# Patient Record
Sex: MALE | Race: WHITE
[De-identification: names, ages, dates, MRNs, and addresses within clinical notes are randomized per-mention and may not be internally consistent; named-entity substitution may affect disease eponyms.]

---

## 2017-07-21 ENCOUNTER — HOSPITAL ENCOUNTER (OUTPATIENT)
Dept: HOSPITAL 17 - HROP | Age: 67
Discharge: HOME | End: 2017-07-21
Attending: UROLOGY
Payer: MEDICARE

## 2017-07-21 VITALS
HEART RATE: 63 BPM | RESPIRATION RATE: 16 BRPM | SYSTOLIC BLOOD PRESSURE: 103 MMHG | TEMPERATURE: 98.1 F | OXYGEN SATURATION: 98 % | DIASTOLIC BLOOD PRESSURE: 67 MMHG

## 2017-07-21 VITALS
SYSTOLIC BLOOD PRESSURE: 104 MMHG | HEART RATE: 54 BPM | OXYGEN SATURATION: 94 % | RESPIRATION RATE: 16 BRPM | DIASTOLIC BLOOD PRESSURE: 64 MMHG

## 2017-07-21 VITALS
DIASTOLIC BLOOD PRESSURE: 72 MMHG | SYSTOLIC BLOOD PRESSURE: 104 MMHG | OXYGEN SATURATION: 93 % | RESPIRATION RATE: 16 BRPM | HEART RATE: 56 BPM

## 2017-07-21 VITALS — WEIGHT: 184.31 LBS | HEIGHT: 68 IN | BODY MASS INDEX: 27.93 KG/M2

## 2017-07-21 VITALS
HEART RATE: 55 BPM | OXYGEN SATURATION: 94 % | DIASTOLIC BLOOD PRESSURE: 66 MMHG | SYSTOLIC BLOOD PRESSURE: 102 MMHG | RESPIRATION RATE: 16 BRPM

## 2017-07-21 DIAGNOSIS — J44.9: ICD-10-CM

## 2017-07-21 DIAGNOSIS — E78.5: ICD-10-CM

## 2017-07-21 DIAGNOSIS — C61: Primary | ICD-10-CM

## 2017-07-21 DIAGNOSIS — I10: ICD-10-CM

## 2017-07-21 LAB
APTT BLD: 26.6 SEC (ref 24.3–30.1)
INR PPP: 0.9 RATIO
PROTHROMBIN TIME: 10.1 SEC (ref 9.8–11.6)

## 2017-07-21 PROCEDURE — 85730 THROMBOPLASTIN TIME PARTIAL: CPT

## 2017-07-21 PROCEDURE — 99152 MOD SED SAME PHYS/QHP 5/>YRS: CPT

## 2017-07-21 PROCEDURE — 77001 FLUOROGUIDE FOR VEIN DEVICE: CPT

## 2017-07-21 PROCEDURE — C1769 GUIDE WIRE: HCPCS

## 2017-07-21 PROCEDURE — C1750 CATH, HEMODIALYSIS,LONG-TERM: HCPCS

## 2017-07-21 PROCEDURE — 99153 MOD SED SAME PHYS/QHP EA: CPT

## 2017-07-21 PROCEDURE — 85610 PROTHROMBIN TIME: CPT

## 2017-07-21 PROCEDURE — 76937 US GUIDE VASCULAR ACCESS: CPT

## 2017-07-21 PROCEDURE — 36558 INSERT TUNNELED CV CATH: CPT

## 2017-07-21 NOTE — RADRPT
EXAM DATE/TIME:  07/21/2017 08:28 

 

This report includes an Addendum and supersedes previous reports for this exam.

 

 

 

[facility] COMPARISON:  

No previous studies available for comparison.

                     

 

INDICATIONS :               

Patient with history of metastatic prostate cancer in need of tunnelled dialysis catheter for plasmap
heresis

                     

 

MEDICAL HISTORY :     

HLD, HTN, Prostate cancer, COPD

 

SURGICAL HISTORY :     

Prostate needle biopsy, Colonoscopy, Brachytherapy

 

ENCOUNTER:     

Initial

 

ACUITY:     

1 month

 

PAIN SCORE:     

0/10

                     

 

FLUORO TIME:     

0.3 minutes

 

IMAGE SERIES:      

0

 

SEDATION TIME:       

30 minutes

                     

 

ACCESS:     

Right internal jugular vein 

 

SEDATION:      

1.)  4 mg midazolam (Versed)  IV     

2.)  200 mcg fentanyl (Sublimaze)  IV     

      

Prophylactic antibiotics were administered with appropriate pre-procedure timing.     

Vancomycin within 2 hours of procedure, Ancef (or alternative) within 1 hour of procedure.     

      

 

DEVICE:      

1.  15 Mongolian single lumen 19 cm Ross II Plus catheter      

 

 

PROCEDURE :     

1.  Ultrasound-guided venipuncture.

2.  PermaCath placement.

3.  Conscious sedation with continuous EKG and oximetry monitoring.

 

The risks, benefits and alternatives to the procedure were explained and verbal and written consent w
as obtained.  The site was prepped in sterile fashion.  Full sterile technique was used, including ca
p, mask, sterile gloves and gown and a large sterile sheet.  Hand hygiene and 2% chlorhexidine and/or
 betadine/alcohol prep was utilized per protocol for cutaneous antisepsis.  The skin and subcutaneous
 tissues were infiltrated with local anesthetic solution.

 

With ultrasound and fluoroscopic guidance a dermatotomy was created over the prescribed vein.  A micr
opuncture set was used to access the targeted vein and serial dilatation was performed to accept the 
prescribed length catheter.  A subcutaneous tunnel was created in a retrograde fashion the catheter w
as pulled through the tunnel.  The catheter was flushed and assembled and locked with heparin.  The c
atheter was sutured in place.

 

Conscious sedation was performed with the prescribed dosages and duration as above in the presence of
 an independent trained radiology nurse to assist in the monitoring of the patient.  EKG and oximetry
 remained stable throughout the procedure.  The patient tolerated the procedure well and there were n
o complications.    The patient was sent to post anesthesia recovery in stable condition.

 

CONCLUSION:     Uncomplicated PermaCath placement as above.

 

 

 

 Eladio Otto Jr., MD on July 21, 2017 at 9:53           

Board Certified Radiologist.

 This report was verified electronically. 

 

ADDENDUM: 

 

There is a correction under the device listed. The device utilized is a 15 Mongolian 19 cm dual lumen Ca
nnon II plus catheter.

 

 Eladio Otto Jr., MD on July 21, 2017 at 15:05           

Board Certified Radiologist.

 This report was verified electronically.

## 2017-07-21 NOTE — PD.RAD
Post Procedure Progress Note


Pre Procedure Diagnosis:  


(1) Prostate cancer


Post Procedure Diagnosis:  


(1) Prostate cancer


Procedure Date:


Jul 21, 2017


Supervising Radiologist:


Eladio Otto JR


Proceduralist/Assist:  Denisse Medina, RT(R), Lauren Reyes, RT(R)(VI)


Anesthesia:  Conscious Sedation


Plan of Activity


Patient to Unit:  [facility]


Patient Condition:  Good


See PACS Report for procedural detail/treatment





Central Venous Access Device


Procedure 1


Right


Internal Jugular


Hemodialysis Catheter Tunneled


Placement


dual lumen


Qatari:  15


Findings:


Permcath in good position. OK to use.


Plan


Remove sutures in 2-3 weeks.








Jr. Fam,Eladio Merlos MD Jul 21, 2017 09:19

## 2017-08-11 ENCOUNTER — HOSPITAL ENCOUNTER (OUTPATIENT)
Dept: HOSPITAL 17 - HROP | Age: 67
Discharge: HOME | End: 2017-08-11
Attending: UROLOGY
Payer: MEDICARE

## 2017-08-11 VITALS
HEART RATE: 74 BPM | TEMPERATURE: 97.9 F | DIASTOLIC BLOOD PRESSURE: 82 MMHG | SYSTOLIC BLOOD PRESSURE: 142 MMHG | RESPIRATION RATE: 18 BRPM | OXYGEN SATURATION: 98 %

## 2017-08-11 VITALS
DIASTOLIC BLOOD PRESSURE: 85 MMHG | SYSTOLIC BLOOD PRESSURE: 140 MMHG | HEART RATE: 63 BPM | RESPIRATION RATE: 18 BRPM | OXYGEN SATURATION: 98 %

## 2017-08-11 DIAGNOSIS — Z85.46: ICD-10-CM

## 2017-08-11 DIAGNOSIS — Z45.2: Primary | ICD-10-CM

## 2017-08-11 DIAGNOSIS — Z79.899: ICD-10-CM

## 2017-08-11 DIAGNOSIS — E78.5: ICD-10-CM

## 2017-08-11 DIAGNOSIS — J44.9: ICD-10-CM

## 2017-08-11 DIAGNOSIS — Z79.82: ICD-10-CM

## 2017-08-11 PROCEDURE — 77001 FLUOROGUIDE FOR VEIN DEVICE: CPT

## 2017-08-11 PROCEDURE — 36589 REMOVAL TUNNELED CV CATH: CPT

## 2017-08-11 NOTE — RADRPT
EXAM DATE/TIME:  08/11/2017 00:00 

 

[facility] COMPARISON:  

No previous studies available for comparison.

 

 

INDICATIONS :               

Patient presents with a history of  prostate cancer in need of dialysis catheter removal due to plasm
apheresis treatment exchange completion. 

                     

 

MEDICAL HISTORY :     

Hx prostate cancer

COPD

HLD

 

SURGICAL HISTORY :     

Prostate needle biopsy

Colonoscopy 

Brachytherapy

 

ENCOUNTER:     

Subsequent

 

ACUITY:     

2 weeks

 

PAIN SCORE:     

0/10

 

LOCATION:        

N/A

                     

                      

 

IMAGE SERIES:      

0

                     

                     

 

      

 

TECH NOTE:   

Dialysis catheter removed without fluoroscopy.DILCIA KAY MR#:[id number] DOB[birth year] Exam Dt/Desc:
 August 11, 2017CENTRAL VENOUS CATHETER REMOVAL, TUNNELED RIGHT     

 

PROCEDURE :     

1.  PermaCath removal.

 

The risks, benefits and alternatives to the procedure were explained and verbal and written consent w
as obtained.  The site was prepped in sterile fashion.  Full sterile technique was used, including ca
p, mask, sterile gloves and gown and a large sterile sheet.  Hand hygiene and 2% chlorhexidine and/or
 betadine/alcohol prep was utilized per protocol for cutaneous antisepsis.  The skin and subcutaneous
 tissues were infiltrated with local anesthetic solution.

 

The tract was anesthetized with 1% Lidocaine using.  The Permcath was dissected from the subcutaneous
 tissues and easily removed in one piece.  Manual pressure was applied to the venotomy site until hem
ostasis was obtained.  Sterile dressing was applied.

 

 

The patient tolerated the procedure well and there were no complications.

 

CONCLUSION:     Uncomplicated Permcath removal.

 

 

 

 Santiago Newton MD on August 11, 2017 at 16:41           

Board Certified Radiologist.

 This report was verified electronically.

## 2018-05-10 ENCOUNTER — HOSPITAL ENCOUNTER (EMERGENCY)
Dept: HOSPITAL 17 - PHED | Age: 68
LOS: 1 days | Discharge: HOME | End: 2018-05-11
Payer: MEDICARE

## 2018-05-10 VITALS
SYSTOLIC BLOOD PRESSURE: 154 MMHG | RESPIRATION RATE: 18 BRPM | OXYGEN SATURATION: 100 % | TEMPERATURE: 99 F | HEART RATE: 92 BPM | DIASTOLIC BLOOD PRESSURE: 98 MMHG

## 2018-05-10 VITALS
RESPIRATION RATE: 18 BRPM | OXYGEN SATURATION: 100 % | HEART RATE: 70 BPM | DIASTOLIC BLOOD PRESSURE: 82 MMHG | SYSTOLIC BLOOD PRESSURE: 143 MMHG

## 2018-05-10 VITALS — OXYGEN SATURATION: 100 % | RESPIRATION RATE: 20 BRPM

## 2018-05-10 VITALS — WEIGHT: 181.66 LBS | HEIGHT: 68 IN | BODY MASS INDEX: 27.53 KG/M2

## 2018-05-10 DIAGNOSIS — R59.1: ICD-10-CM

## 2018-05-10 DIAGNOSIS — K57.90: ICD-10-CM

## 2018-05-10 DIAGNOSIS — J44.9: ICD-10-CM

## 2018-05-10 DIAGNOSIS — R33.9: Primary | ICD-10-CM

## 2018-05-10 DIAGNOSIS — Z79.82: ICD-10-CM

## 2018-05-10 DIAGNOSIS — N32.89: ICD-10-CM

## 2018-05-10 DIAGNOSIS — F32.9: ICD-10-CM

## 2018-05-10 DIAGNOSIS — I10: ICD-10-CM

## 2018-05-10 DIAGNOSIS — K94.09: ICD-10-CM

## 2018-05-10 PROCEDURE — 96375 TX/PRO/DX INJ NEW DRUG ADDON: CPT

## 2018-05-10 PROCEDURE — 85025 COMPLETE CBC W/AUTO DIFF WBC: CPT

## 2018-05-10 PROCEDURE — 80053 COMPREHEN METABOLIC PANEL: CPT

## 2018-05-10 PROCEDURE — 74176 CT ABD & PELVIS W/O CONTRAST: CPT

## 2018-05-10 PROCEDURE — 96374 THER/PROPH/DIAG INJ IV PUSH: CPT

## 2018-05-10 PROCEDURE — 83690 ASSAY OF LIPASE: CPT

## 2018-05-10 PROCEDURE — 99284 EMERGENCY DEPT VISIT MOD MDM: CPT

## 2018-05-10 PROCEDURE — 51702 INSERT TEMP BLADDER CATH: CPT

## 2018-05-10 NOTE — RADRPT
EXAM DATE/TIME:  05/10/2018 22:39 

 

[facility] COMPARISON:     

No previous studies available for comparison.

 

 

INDICATIONS :     

Dysuria.

                  

 

ORAL CONTRAST:      

No oral contrast ingested.

                  

 

RADIATION DOSE:     

9.63 CTDIvol (mGy) 

 

 

MEDICAL HISTORY :     

Hypertension. Chronic obstructive pulmonary disease. Carcinoma, prostate.Hiatal hernia, rectal mass

 

SURGICAL HISTORY :      

Colostomy. 

 

ENCOUNTER:      

Initial

 

ACUITY:      

1 day

 

PAIN SCALE:      

10/10

 

LOCATION:         

abdomen

 

TECHNIQUE:     

Volumetric scanning of the abdomen and pelvis was performed.  Using automated exposure control and ad
justment of the mA and/or kV according to patient size, radiation dose was kept as low as reasonably 
achievable to obtain optimal diagnostic quality images.  DICOM format image data is available electro
nically for review and comparison.  

 

FINDINGS:     

 

LOWER LUNGS:     

The visualized lower lungs are clear.

 

LIVER:     

Homogeneous density without lesion.  There is no dilation of the biliary tree.  No calcified gallston
es.

 

SPLEEN:     

Normal size without lesion.

 

PANCREAS:     

Within normal limits. 

 

KIDNEYS:     

Normal in size and shape.  There is no mass, stone, or hydronephrosis on the left. Punctate nonobstru
cting calculus upper pole right kidney measures 2 mm. There is mild prominence right collecting syste
m. No distal ureteral calculus.

 

ADRENAL GLANDS:     

Within normal limits.

 

VASCULAR:     

There is no aortic aneurysm.

 

BOWEL/MESENTERY:     

Left lower quadrant colostomy with stomal hernia. No signs of strangulation or obstruction. Diverticu
losis of the colon.  There is no free intraperitoneal air or fluid. 

 

ABDOMINAL WALL:     

Within normal limits.

 

RETROPERITONEUM:     

Prominent retroperitoneal adenopathy largest distal bifurcation measuring 2.5 x 1.8 cm and the left..


 

BLADDER:     

No wall thickening or mass. There is hyperdensity/debris within the bladder posteriorly could be hema
tomasz or other abnormality.

 

REPRODUCTIVE:     

Within normal limits.

 

INGUINAL:     

There is no lymphadenopathy or hernia.

 

MUSCULOSKELETAL:     

Within normal limits for patient age.

 

CONCLUSION:     

1. Debris/hyperdensity within the bladder could be hematoma or other abnormality. Urinalysis recommen
ded.

2. Mild prominence of the right collecting system but no distal ureteral calculus.

3. Retroperitoneal lymphadenopathy.

4. Diverticulosis of the colon.

5. Left lower quadrant colostomy stomal hernia.

 

 

 

 Den Rodríguez MD on May 10, 2018 at 23:14           

Board Certified Radiologist.

 This report was verified electronically.

## 2018-05-10 NOTE — PD
HPI


Chief Complaint:  Bloody urine


Time Seen by Provider:  23:21


Travel History


International Travel<30 days:  No


Contact w/Intl Traveler<30days:  No


Traveled to known affect area:  No





History of Present Illness


HPI


Patient comes in complaining of suprapubic pain, sharp, 9 out of 10, relieved 

slightly by urination.  However his urination has decreased tremendously since 

he started noticing large blood clots through his urine stream.  Patient states 

that he had a recent cystoscopy in which Dr. Amador discovered a bladder mass 

which was causing his hematuria.








No known drug allergy


Past medical history significant for hypertension COPD hiatal hernia and most 

recently a bladder CA, he is expected to have bladder mass removal on May 31 of 

this year by Dr. AMADOR





[facility]


Past Medical History


Cancer:  Yes (PROSTATE, RECENT BX RECTAL MASS)


Cardiovascular Problems:  No


Diabetes:  No


Endocrine:  No


Genitourinary:  Yes (DYSURIA)


Hepatitis:  No


Hiatal Hernia:  Yes


Immune Disorder:  No


Musculoskeletal:  No


Neurologic:  No


Psychiatric:  Yes (DEPRESSION)


Reproductive:  No


Respiratory:  Yes (COPD)


Thyroid Disease:  No





Past Surgical History


Abdominal Surgery:  No


AICD:  No


Cardiac Surgery:  No


Ear Surgery:  No


Endocrine Surgery:  No


Eye Surgery:  No


Genitourinary Surgery:  No


Gynecologic Surgery:  No


Joint Replacement:  No


Oral Surgery:  No


Pacemaker:  No


Thoracic Surgery:  No





Social History


Substance Use:  No





Allergies-Medications


(Allergen,Severity, Reaction):  


Coded Allergies:  


     No Known Allergies (Verified  Adverse Reaction, Unknown, 5/11/18)


Reported Meds & Prescriptions





Reported Meds & Active Scripts


Active


Reported


Xgeva Inj (Denosumab) 120 Mg/1.7 Ml Inj 70 Mg SQ Q28D


Eligard Inj Kit (Leuprolide Acetate) 7.5 Mg Kit 7.5 Mg SQ Q30D


Centrum Silver Adult 50+ (Multiple Vitamins W/ Minerals) 1 Tab Tab 1 Tab PO 

DAILY


Vitamin D3 (Cholecalciferol) 1,000 Unit Cap 4,000 Units PO DAILY


Fish Oil + D3 (Fish Oil-Cholecalciferol) 1,200-1,000 Mg-Unit Cap 1 Cap PO DAILY


Calcium Ascorbate 500 Mg Tab 500 Mg PO DAILY


Aspirin Low Dose (Aspirin) 81 Mg Chew 81 Mg CHEW DAILY


Tamsulosin (Tamsulosin HCl) 0.4 Mg Cap 0.4 Mg PO HS


Atorvastatin (Atorvastatin Calcium) 40 Mg Tab 40 Mg PO HS


Amlodipine (Amlodipine Besylate) 10 Mg Tab 10 Mg PO DAILY








Review of Systems


General / Constitutional:  No: Fever


Eyes:  No: Visual changes


HENT:  No: Headaches


Cardiovascular:  No: Chest Pain or Discomfort


Respiratory:  No: Shortness of Breath


Gastrointestinal:  Positive: Abdominal Pain (Suprapubic pain)


Genitourinary:  Positive: Hematuria


Musculoskeletal:  No: Pain


Skin:  No Rash


Neurologic:  No: Weakness


Psychiatric:  No: Depression


Endocrine:  No: Polydipsia


Hematologic/Lymphatic:  No: Easy Bruising





Physical Exam


Narrative


GENERAL: 


SKIN: Warm and dry.


HEAD: Atraumatic. Normocephalic. 


EYES: Pupils equal and round. No scleral icterus. No injection or drainage. 


ENT: No nasal bleeding or discharge.  Mucous membranes pink and moist.


NECK: Trachea midline. No JVD. 


CARDIOVASCULAR: Regular rate and rhythm.  


RESPIRATORY: No accessory muscle use. Clear to auscultation. Breath sounds 

equal bilaterally. 


GASTROINTESTINAL: Abdomen soft, non-tender, nondistended.  Suprapubic fullness, 

noted gross hematuria with a few drops of urine.


MUSCULOSKELETAL: Extremities without clubbing, cyanosis, or edema. No obvious 

deformities. 


NEUROLOGICAL: Awake and alert. No obvious cranial nerve deficits.  Motor 

grossly within normal limits. Five out of 5 muscle strength in the arms and 

legs.  Normal speech.


PSYCHIATRIC: Appropriate mood and affect; insight and judgment normal.





Data


Data


Last Documented VS





Vital Signs








  Date Time  Temp Pulse Resp B/P (MAP) Pulse Ox O2 Delivery O2 Flow Rate FiO2


 


5/11/18 01:20   18     


 


5/10/18 22:20 99.0 92  154/98 (116) 100   








Orders





 Orders


Urinalysis - C+S If Indicated (5/10/18 22:20)


Ct Abd/Pel W/O Iv Contrast (5/10/18 22:20)


Complete Blood Count With Diff (5/10/18 23:22)


Comprehensive Metabolic Panel (5/10/18 23:22)


Lipase (5/10/18 23:22)


Iv Access Insert/Monitor (5/10/18 23:22)


Ecg Monitoring (5/10/18 23:22)


Oximetry (5/10/18 23:22)


Hydromorphone Pf Inj (Dilaudid Pf Inj) (5/10/18 23:30)


Ondansetron Inj (Zofran Inj) (5/10/18 23:30)


Sodium Chloride 0.9% Flush (Ns Flush) (5/10/18 23:30)


Bladder/Catheter Irrigation (5/10/18 23:22)


Lidocaine 2% Viscous (Xylocaine 2% Visco (5/11/18 00:30)


Urinary Catheter Management CAROLYN.Q8H (5/11/18 01:21)





Labs





Laboratory Tests








Test


  5/11/18


00:01


 


White Blood Count 5.1 TH/MM3 


 


Red Blood Count 3.72 MIL/MM3 


 


Hemoglobin 12.0 GM/DL 


 


Hematocrit 35.0 % 


 


Mean Corpuscular Volume 94.3 FL 


 


Mean Corpuscular Hemoglobin 32.2 PG 


 


Mean Corpuscular Hemoglobin


Concent 34.1 % 


 


 


Red Cell Distribution Width 13.8 % 


 


Platelet Count 248 TH/MM3 


 


Mean Platelet Volume 8.9 FL 


 


Neutrophils (%) (Auto) 77.0 % 


 


Lymphocytes (%) (Auto) 12.9 % 


 


Monocytes (%) (Auto) 8.2 % 


 


Eosinophils (%) (Auto) 1.6 % 


 


Basophils (%) (Auto) 0.3 % 


 


Neutrophils # (Auto) 3.9 TH/MM3 


 


Lymphocytes # (Auto) 0.7 TH/MM3 


 


Monocytes # (Auto) 0.4 TH/MM3 


 


Eosinophils # (Auto) 0.1 TH/MM3 


 


Basophils # (Auto) 0.0 TH/MM3 


 


CBC Comment DIFF FINAL 


 


Differential Comment  


 


Blood Urea Nitrogen 22 MG/DL 


 


Creatinine 1.00 MG/DL 


 


Random Glucose 133 MG/DL 


 


Total Protein 7.0 GM/DL 


 


Albumin 3.7 GM/DL 


 


Calcium Level 9.3 MG/DL 


 


Alkaline Phosphatase 85 U/L 


 


Aspartate Amino Transf


(AST/SGOT) 23 U/L 


 


 


Alanine Aminotransferase


(ALT/SGPT) 20 U/L 


 


 


Total Bilirubin 0.4 MG/DL 


 


Sodium Level 141 MEQ/L 


 


Potassium Level 3.4 MEQ/L 


 


Chloride Level 108 MEQ/L 


 


Carbon Dioxide Level 25.9 MEQ/L 


 


Anion Gap 7 MEQ/L 


 


Estimat Glomerular Filtration


Rate 75 ML/MIN 


 


 


Lipase 73 U/L 











MDM


Medical Decision Making


Medical Screen Exam Complete:  Yes


Emergency Medical Condition:  Yes


Medical Record Reviewed:  Yes


Differential Diagnosis


Kidney stones versus UTI versus bladder mass versus renal cell carcinoma versus 

urinary retention secondary to blood clots


Narrative Course


No evidence of leukocytosis, no anemia, normal platelet count, no left shift


Electrolytes are all within normal limits, GFR is decreased at 75, random 

glucose is 133 however normal LFTs normal bilirubin normal alk phos and normal 

pancreatic enzymes





CT abdomen pelvis read by radiologist showing debris hyperdensity within the 

bladder which could be hematoma.  Mild prominence of the right collecting 

system but no distal ureteral calculus, retroperitoneal lymphadenopathy, 

diverticulosis of the colon without diverticulitis.----This CT findings are 

consistent with the patient's history of a bladder mass especially with those 

enlarged retroperitoneal lymph nodes.  The patient already has a urologist, he 

is already scheduled to have a procedure to remove that mass, and already has 

follow-up by Dr. Amador





The patient will have continuous bladder irrigation a few hours and attempt to 

go to clear.  Or as clear as possible.  After this is achieved the patient will 

have HIS CBI switch to a Snow and DC WITH like bag





Diagnosis





 Primary Impression:  


 Urinary retention secondary to blood clots


 Additional Impression:  


 Blood clots secondary to bladder mass


Referrals:  


Mack Amador MD


Please make the urologist aware that you required continuous bladder irrigation 

in order to get rid of your urinary retention.





***Additional Instructions:  


Please make the urologist aware that you required continuous bladder irrigation 

in order to get rid of your urinary retention.


Disposition:  01 DISCHARGE HOME


Condition:  Stable











Henok Farrar MD May 10, 2018 23:28

## 2018-05-11 VITALS
HEART RATE: 67 BPM | SYSTOLIC BLOOD PRESSURE: 147 MMHG | RESPIRATION RATE: 18 BRPM | DIASTOLIC BLOOD PRESSURE: 78 MMHG | OXYGEN SATURATION: 99 %

## 2018-05-11 VITALS
SYSTOLIC BLOOD PRESSURE: 142 MMHG | HEART RATE: 67 BPM | OXYGEN SATURATION: 99 % | DIASTOLIC BLOOD PRESSURE: 80 MMHG | RESPIRATION RATE: 18 BRPM

## 2018-05-11 VITALS
HEART RATE: 61 BPM | RESPIRATION RATE: 18 BRPM | OXYGEN SATURATION: 98 % | SYSTOLIC BLOOD PRESSURE: 137 MMHG | DIASTOLIC BLOOD PRESSURE: 80 MMHG

## 2018-05-11 VITALS
OXYGEN SATURATION: 100 % | RESPIRATION RATE: 16 BRPM | DIASTOLIC BLOOD PRESSURE: 78 MMHG | SYSTOLIC BLOOD PRESSURE: 140 MMHG | HEART RATE: 64 BPM

## 2018-05-11 VITALS
SYSTOLIC BLOOD PRESSURE: 133 MMHG | OXYGEN SATURATION: 99 % | RESPIRATION RATE: 18 BRPM | DIASTOLIC BLOOD PRESSURE: 72 MMHG | HEART RATE: 62 BPM

## 2018-05-11 VITALS — SYSTOLIC BLOOD PRESSURE: 149 MMHG | DIASTOLIC BLOOD PRESSURE: 74 MMHG

## 2018-05-11 LAB
ALBUMIN SERPL-MCNC: 3.7 GM/DL (ref 3.4–5)
ALP SERPL-CCNC: 85 U/L (ref 45–117)
ALT SERPL-CCNC: 20 U/L (ref 12–78)
AST SERPL-CCNC: 23 U/L (ref 15–37)
BASOPHILS # BLD AUTO: 0 TH/MM3 (ref 0–0.2)
BASOPHILS NFR BLD: 0.3 % (ref 0–2)
BILIRUB SERPL-MCNC: 0.4 MG/DL (ref 0.2–1)
BUN SERPL-MCNC: 22 MG/DL (ref 7–18)
CALCIUM SERPL-MCNC: 9.3 MG/DL (ref 8.5–10.1)
CHLORIDE SERPL-SCNC: 108 MEQ/L (ref 98–107)
CREAT SERPL-MCNC: 1 MG/DL (ref 0.6–1.3)
EOSINOPHIL # BLD: 0.1 TH/MM3 (ref 0–0.4)
EOSINOPHIL NFR BLD: 1.6 % (ref 0–4)
ERYTHROCYTE [DISTWIDTH] IN BLOOD BY AUTOMATED COUNT: 13.8 % (ref 11.6–17.2)
GFR SERPLBLD BASED ON 1.73 SQ M-ARVRAT: 75 ML/MIN (ref 89–?)
GLUCOSE SERPL-MCNC: 133 MG/DL (ref 74–106)
HCO3 BLD-SCNC: 25.9 MEQ/L (ref 21–32)
HCT VFR BLD CALC: 35 % (ref 39–51)
HGB BLD-MCNC: 12 GM/DL (ref 13–17)
LYMPHOCYTES # BLD AUTO: 0.7 TH/MM3 (ref 1–4.8)
LYMPHOCYTES NFR BLD AUTO: 12.9 % (ref 9–44)
MCH RBC QN AUTO: 32.2 PG (ref 27–34)
MCHC RBC AUTO-ENTMCNC: 34.1 % (ref 32–36)
MCV RBC AUTO: 94.3 FL (ref 80–100)
MONOCYTE #: 0.4 TH/MM3 (ref 0–0.9)
MONOCYTES NFR BLD: 8.2 % (ref 0–8)
NEUTROPHILS # BLD AUTO: 3.9 TH/MM3 (ref 1.8–7.7)
NEUTROPHILS NFR BLD AUTO: 77 % (ref 16–70)
PLATELET # BLD: 248 TH/MM3 (ref 150–450)
PMV BLD AUTO: 8.9 FL (ref 7–11)
PROT SERPL-MCNC: 7 GM/DL (ref 6.4–8.2)
RBC # BLD AUTO: 3.72 MIL/MM3 (ref 4.5–5.9)
SODIUM SERPL-SCNC: 141 MEQ/L (ref 136–145)
WBC # BLD AUTO: 5.1 TH/MM3 (ref 4–11)

## 2018-05-11 NOTE — PD
Physical Exam


Date Seen by Provider:  May 11, 2018


Time Seen by Provider:  03:15


Narrative


Patient initially seen by Dr. Farrar, please see his notes for further 

details.  He was planned for bladder irrigation and release with follow-up to 

his urologist, Dr. Amador, but patient's nurse was concerned because after 6 L 

of bladder irrigation, the patient is still having what looks like gross 

hematuria.  Vital signs are stable in the ER.  His hemoglobin was 12.  And at 

this point, the case was discussed with urologist covering, Dr. Saravia, and he 

states that he would still release the patient with the Snow catheter and have 

him follow-up in the morning which is a few hours from now with Dr. Kwan's.  

Return for any worsening in pain or new issues as needed.  The plan was 

discussed with the patient he states understanding.





Data


Data


Last Documented VS





Vital Signs








  Date Time  Temp Pulse Resp B/P (MAP) Pulse Ox O2 Delivery O2 Flow Rate FiO2


 


5/11/18 01:58  61 18 137/80 (99) 98 Room Air  


 


5/10/18 22:20 99.0       








Orders





 Orders


Urinalysis - C+S If Indicated (5/10/18 22:20)


Ct Abd/Pel W/O Iv Contrast (5/10/18 22:20)


Complete Blood Count With Diff (5/10/18 23:22)


Comprehensive Metabolic Panel (5/10/18 23:22)


Lipase (5/10/18 23:22)


Iv Access Insert/Monitor (5/10/18 23:22)


Ecg Monitoring (5/10/18 23:22)


Oximetry (5/10/18 23:22)


Hydromorphone Pf Inj (Dilaudid Pf Inj) (5/10/18 23:30)


Ondansetron Inj (Zofran Inj) (5/10/18 23:30)


Sodium Chloride 0.9% Flush (Ns Flush) (5/10/18 23:30)


Bladder/Catheter Irrigation (5/10/18 23:22)


Lidocaine 2% Viscous (Xylocaine 2% Visco (5/11/18 00:30)


Urinary Catheter Management CAROLYN.Q8H (5/11/18 01:21)


Ed Discharge Order (5/11/18 03:25)





Labs





Laboratory Tests








Test


  5/11/18


00:01


 


White Blood Count 5.1 TH/MM3 


 


Red Blood Count 3.72 MIL/MM3 


 


Hemoglobin 12.0 GM/DL 


 


Hematocrit 35.0 % 


 


Mean Corpuscular Volume 94.3 FL 


 


Mean Corpuscular Hemoglobin 32.2 PG 


 


Mean Corpuscular Hemoglobin


Concent 34.1 % 


 


 


Red Cell Distribution Width 13.8 % 


 


Platelet Count 248 TH/MM3 


 


Mean Platelet Volume 8.9 FL 


 


Neutrophils (%) (Auto) 77.0 % 


 


Lymphocytes (%) (Auto) 12.9 % 


 


Monocytes (%) (Auto) 8.2 % 


 


Eosinophils (%) (Auto) 1.6 % 


 


Basophils (%) (Auto) 0.3 % 


 


Neutrophils # (Auto) 3.9 TH/MM3 


 


Lymphocytes # (Auto) 0.7 TH/MM3 


 


Monocytes # (Auto) 0.4 TH/MM3 


 


Eosinophils # (Auto) 0.1 TH/MM3 


 


Basophils # (Auto) 0.0 TH/MM3 


 


CBC Comment DIFF FINAL 


 


Differential Comment  


 


Blood Urea Nitrogen 22 MG/DL 


 


Creatinine 1.00 MG/DL 


 


Random Glucose 133 MG/DL 


 


Total Protein 7.0 GM/DL 


 


Albumin 3.7 GM/DL 


 


Calcium Level 9.3 MG/DL 


 


Alkaline Phosphatase 85 U/L 


 


Aspartate Amino Transf


(AST/SGOT) 23 U/L 


 


 


Alanine Aminotransferase


(ALT/SGPT) 20 U/L 


 


 


Total Bilirubin 0.4 MG/DL 


 


Sodium Level 141 MEQ/L 


 


Potassium Level 3.4 MEQ/L 


 


Chloride Level 108 MEQ/L 


 


Carbon Dioxide Level 25.9 MEQ/L 


 


Anion Gap 7 MEQ/L 


 


Estimat Glomerular Filtration


Rate 75 ML/MIN 


 


 


Lipase 73 U/L 











MDM


Medical Record Reviewed:  Yes


Supervised Visit with LV:  No


Diagnosis





 Primary Impression:  


 Urinary retention secondary to blood clots


 Additional Impression:  


 Blood clots secondary to bladder mass


Referrals:  


Mack Amador MD


1 day


Please make the urologist aware that you required continuous bladder irrigation 

in order to get rid of your urinary retention.





***Additional Instruction:  


Please make the urologist aware that you required continuous bladder irrigation 

in order to get rid of your urinary retention.


Disposition:  01 DISCHARGE HOME


Condition:  Stable











SoonJerman szymanski MD May 11, 2018 03:27